# Patient Record
Sex: MALE | Race: WHITE | HISPANIC OR LATINO | Employment: FULL TIME | URBAN - METROPOLITAN AREA
[De-identification: names, ages, dates, MRNs, and addresses within clinical notes are randomized per-mention and may not be internally consistent; named-entity substitution may affect disease eponyms.]

---

## 2021-05-16 ENCOUNTER — OCCUPATIONAL MEDICINE (OUTPATIENT)
Dept: URGENT CARE | Facility: CLINIC | Age: 26
End: 2021-05-16
Payer: COMMERCIAL

## 2021-05-16 VITALS
HEART RATE: 67 BPM | OXYGEN SATURATION: 98 % | TEMPERATURE: 99 F | RESPIRATION RATE: 16 BRPM | SYSTOLIC BLOOD PRESSURE: 110 MMHG | DIASTOLIC BLOOD PRESSURE: 78 MMHG | WEIGHT: 143 LBS

## 2021-05-16 DIAGNOSIS — T15.91XA FOREIGN BODY OF RIGHT EYE, INITIAL ENCOUNTER: ICD-10-CM

## 2021-05-16 DIAGNOSIS — S05.01XA ABRASION OF RIGHT CORNEA, INITIAL ENCOUNTER: Primary | ICD-10-CM
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
## 2021-05-16 PROCEDURE — 99204 OFFICE O/P NEW MOD 45 MIN: CPT | Performed by: PHYSICIAN ASSISTANT

## 2021-05-16 RX ORDER — POLYMYXIN B SULFATE AND TRIMETHOPRIM 1; 10000 MG/ML; [USP'U]/ML
1 SOLUTION OPHTHALMIC EVERY 4 HOURS
Qty: 5 ML | Refills: 0 | Status: SHIPPED | OUTPATIENT
Start: 2021-05-16 | End: 2021-05-23

## 2021-05-16 NOTE — PATIENT INSTRUCTIONS
Abrasión corneal  Corneal Abrasion    Fallon abrasión corneal es un rasguño o lesión en la cubierta transparente en la parte delantera del heidi (córnea). La córnea es fallon especie de cúpula transparente que protege el heidi y ayuda a fijar la vista. La córnea está formada por muchas capas. La capa más superficial es fallon carloz capa de células llamada epitelio corneal. La córnea es alecia de los tejidos más sensibles del cuerpo. Fallon abrasión corneal puede resultar muy dolorosa.  Si no se trata, puede infectarse y producir fallon úlcera. [place] puede dejar cicatrices. Las cicatrices en la córnea pueden afectar la vista. A veces, las abrasiones pueden volver a aparecer en la misma hira, incluso después de que la lesión original haya cicatrizado (síndrome erosivo recurrente).  ¿Cuáles son las causas?  Esta afección puede ser causada por lo siguiente:  · Un golpe o pinchazo en el heidi.  · Faloln sustancia arenosa o irritante (cuerpo extraño) en el heidi.  · Frotarse el heidi en exceso.  · Ojos muy secos.  · Ciertas infecciones oculares.  · Lentes de contacto que no encajan mauro o que se usan arnulfo períodos prolongados. También puede lastimarse la córnea cuando se pone o se juwan los lentes de contacto.  · Cirugía ocular.  A veces, la causa es desconocida.  ¿Cuáles son los signos o los síntomas?  Los síntomas de esta afección incluyen lo siguiente:  · Dolor en el hedii. El dolor puede empeorar cuando abre o mueve los ojos.  · Sensación de que tiene algo metido en el heidi.  · Dificultad para mantener los ojos abiertos o imposibilidad de mantenerlos abiertos.  · Lagrimeo y enrojecimiento.  · Sensibilidad a la kamala.  · Visión borrosa.  · Dolor de madalyn.  ¿Cómo se diagnostica?  Esta afección se puede diagnosticar en función de lo siguiente:  · Ondina antecedentes médicos.  · Ondina síntomas.  · Un examen ocular. Puede consultar a un especialista en afecciones y enfermedades oculares (oftalmólogo). Antes del examen ocular, es posible que le coloquen gotas  anestésicas dentro del heidi. También es posible que le coloquen fallon sustancia de contraste con un gotero o con fallon pequeña georgina de papel. Con la sustancia de contraste, al oftalmólogo le resulta más fácil jose la abrasión cuando le examina el heidi con fallon kamala. El oftalmólogo puede examinarle el heidi a través de un oftalmoscopio (lámpara de ammyidura).  ¿Cómo se trata esta afección?  El tratamiento puede variar según la causa de la afección, y puede incluir lo siguiente:  · Lavado del heidi.  · Eliminación de todo cuerpo extraño.  · Gotas o pomadas con antibiótico para tratar fallon infección.  · Gotas o pomadas con corticoesteroides para tratar el enrojecimiento, la irritación o la inflamación.  · Analgésicos.  · Un parche ocular para mantener el heidi cerrado.  Siga estas indicaciones en rust casa:  Medicamentos  · Use gotas o pomadas oftálmicas según las indicaciones del médico.  · Si le recetaron gotas o pomada con antibiótico, úselas según las indicaciones del médico. No deje de usar el antibiótico aunque comience a sentirse mejor.  · Nekoosa los medicamentos de venta merissa y los recetados solamente ronnell se lo haya indicado el médico.  · No conduzca ni use maquinaria pesada mientras ayala analgésicos recetados.  Instrucciones generales  · Si tiene un parche ocular, úselo según las indicaciones del médico.  ? No conduzca ni use maquinaria mientras usa el parche ocular. En estas condiciones no puede juzgar correctamente las distancias.  ? Siga las indicaciones del médico acerca de cuándo quitarse el parche.  · Pregúntele al médico si puede usar un paño frío y húmedo (compresa) en el heidi para aliviar el dolor.  · No se toque ni se frote el heidi. No se lave el heidi.  · No use lentes de contacto hasta que el médico lo autorice.  · Evite la kamala antea y la fatiga ocular.  · Concurra a todas las visitas de control ronnell se lo haya indicado el médico. [place] es importante para prevenir infecciones y evitar la pérdida de visión.  Comuníquese  con un médico si:  · Continúa con dolor en el heidi y otros síntomas arnulfo más de 2 días.  · Tiene síntomas nuevos, ronnell enrojecimiento, lagrimeo o secreción.  · Tiene fallon secreción que le melba los ojos pegados a la mañana.  · El parche ocular se afloja al punto que puede parpadear.  · Los síntomas vuelven a aparecer después de que la abrasión original haya cicatrizado.  Solicite ayuda de inmediato si:  · Tiene dolor intenso en el heidi que no mejora con medicamentos.  · Tiene pérdida de visión.  Resumen  · Fallon abrasión corneal es un rasguño en la cubierta transparente en la parte delantera del heidi (córnea).  · La abrasión corneal puede causar dolor en el heidi, enrojecimiento, lagrimeo o visión borrosa.  · Por lo general, esta afección se trata con medicamentos para prevenir infecciones y evitar que queden cicatrices anormales. También es posible que deba usar un parche ocular para cubrirse el heidi.  · Informe al médico si cristel síntomas continúan arnulfo más de 2 días.  Esta información no tiene ronnell fin reemplazar el consejo del médico. Asegúrese de hacerle al médico cualquier pregunta que tenga.  Document Released: 12/18/2006 Document Revised: 03/21/2018 Document Reviewed: 03/21/2018  Elsevier Interactive Patient Education © 2020 Elsevier Inc.

## 2021-05-16 NOTE — LETTER
EMPLOYEE’S CLAIM FOR COMPENSATION/ REPORT OF INITIAL TREATMENT  FORM C-4    EMPLOYEE’S CLAIM - PROVIDE ALL INFORMATION REQUESTED   First Name  Wallace Last Name  Troy Bonilla Birthdate                    1995                Sex  male Claim Number   Home Address  [address] Age  25 y.o. Height   Weight  64.9 kg (143 lb) 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 (if applicable)  I was cutting a bourd of wood and the dust when in my eye because the win    How did this injury or occupational disease occur? (Be specific an answer in detail. Use additional sheet if necessary)  I was cuting a bourd of wood and the dust went into my eye becuse the wine. I was whering my glese   If you believe that you have an occupational disease, when did you first have knowledge of the disability and it relationship to your employment?   Witnesses to the Accident  Oliver Quiñones      Nature of Injury or Occupational Disease  Workers' Compensation  Part(s) of Body Injured or Affected  Eye (R), ,     I certify that the above is true and correct to the best of my knowledge and that I have provided this  information in order to obtain the benefits of [place]  I hereby authorize any physician, chiropractor, surgeon, practitioner, or other person, any hospital, including [facility] or [facility], any medical service organization, any insurance company, or other institution or organization to release to each other, any medical or other information, including benefits paid or payable, pertinent to this injury or disease, except information relative to diagnosis, treatment and/or counseling for AIDS, psychological conditions, alcohol or controlled substances, for which I must give specific authorization.  A Photostat of this authorization shall be as valid as the original.     Date   Place   Employee’s Signature   THIS REPORT MUST BE COMPLETED AND MAILED WITHIN 3 WORKING DAYS OF TREATMENT   Place  [facility]  Name of [facility]   Date  5/16/2021 Diagnosis  (S05.01XA) Abrasion of right cornea, initial encounter  (primary encounter diagnosis)  (T15.91XA) Foreign body of right eye, initial encounter  (Y99.0) Work related injury Is there evidence the injured employee was under the              influence of alcohol and/or another controlled substance at the time of accident?   Hour  3:59 PM Description of Injury or Disease  The primary encounter diagnosis was Abrasion of right cornea, initial encounter. Diagnoses of Foreign body of right eye, initial encounter and Work related injury were also pertinent to this visit. No   Treatment  The foreign body was removed from right upper eyelid however the patient sustained a large corneal abrasion.  He will remain out of work for the next 2 days.  He will apply Polytrim every 4 hours x7 days.    He will return to clinic in 2 days for reevaluation.  Have you advised the patient to remain off work five days or     more? No   X-Ray  "Findings      If Yes   From Date  To Date      From information given by the employee, together with medical evidence, can you directly connect this injury or occupational disease as job incurred?  Yes If No Full Duty    No Modified Duty  No   Is additional medical care by a physician indicated?  Yes If Modified Duty, Specify any Limitations / Restrictions      Do you know of any previous injury or disease contributing to this condition or occupational disease?                            No   Date  5/16/2021 Print Doctor’s Name   Tereza Wallace P.A.-C. I certify the employer’s copy of  this form was mailed on:   Address  [address] Insurer’s Use Only     [facility] [place] [facility]  [address]    Provider’s Tax ID Number  [id number] Telephone  Dept: [phone]      e-TEERZA Ortega P.A.-C.  Signature:     Degree          ORIGINAL-TREATING PHYSICIAN OR CHIROPRACTOR    PAGE 2-INSURER/TPA    PAGE 3-EMPLOYER    PAGE 4-EMPLOYEE        Form C-4 (rev.10/07)           BRIEF DESCRIPTION OF RIGHTS AND BENEFITS  (Pursuant to [place] 616C.050)    Notice of Injury or Occupational Disease (Incident Report Form C-1): If an injury or occupational disease (OD) arises out of and in the course of employment, you must provide written notice to your employer as soon as practicable, but no later than 7 days after the accident or OD. Your employer shall maintain a sufficient supply of the required forms.    Claim for Compensation (Form C-4): If medical treatment is sought, the form C-4 is available at the place of initial treatment. A completed \"Claim for Compensation\" (Form C-4) must be filed within 90 days after an accident or OD. The treating physician or chiropractor must, within 3 working days after treatment, complete and mail to the employer, the employer's insurer and third-party , the Claim for Compensation.    Medical Treatment: If you require medical treatment for your on-the-job injury or OD, you may " be required to select a physician or chiropractor from a list provided by your workers’ compensation insurer, if it has contracted with an Organization for Managed Care (MCO) or Preferred Provider Organization (PPO) or providers of health care. If your employer has not entered into a contract with an MCO or PPO, you may select a physician or chiropractor from the Panel of Physicians and Chiropractors. Any medical costs related to your industrial injury or OD will be paid by your insurer.    Temporary Total Disability (TTD): If your doctor has certified that you are unable to work for a period of at least 5 consecutive days, or 5 cumulative days in a 20-day period, or places restrictions on you that your employer does not accommodate, you may be entitled to TTD compensation.    Temporary Partial Disability (TPD): If the wage you receive upon reemployment is less than the compensation for TTD to which you are entitled, the insurer may be required to pay you TPD compensation to make up the difference. TPD can only be paid for a maximum of 24 months.    Permanent Partial Disability (PPD): When your medical condition is stable and there is an indication of a PPD as a result of your injury or OD, within 30 days, your insurer must arrange for an evaluation by a rating physician or chiropractor to determine the degree of your PPD. The amount of your PPD award depends on the date of injury, the results of the PPD evaluation, your age and wage.    Permanent Total Disability (PTD): If you are medically certified by a treating physician or chiropractor as permanently and totally disabled and have been granted a PTD status by your insurer, you are entitled to receive monthly benefits not to exceed 66 2/3% of your average monthly wage. The amount of your PTD payments is subject to reduction if you previously received a lump-sum PPD award.    Vocational Rehabilitation Services: You may be eligible for vocational rehabilitation  services if you are unable to return to the job due to a permanent physical impairment or permanent restrictions as a result of your injury or occupational disease.    Transportation and Per Stuart Reimbursement: You may be eligible for travel expenses and per stuart associated with medical treatment.    Reopening: You may be able to reopen your claim if your condition worsens after claim closure.     Appeal Process: If you disagree with a written determination issued by the insurer or the insurer does not respond to your request, you may appeal to the Department of Administration, , by following the instructions contained in your determination letter. You must appeal the determination within 70 days from the date of the determination letter at [address], [address], [place], [place] [address], or [address], [place], [place] [address]. If you disagree with the  decision, you may appeal to the Department of Administration, . You must file your appeal within 30 days from the date of the  decision letter at [address], [address], [place], [place] [address], or [address], [place] [address]. If you disagree with a decision of an , you may file a petition for judicial review with the District Court. You must do so within 30 days of the Appeal Officer’s decision. You may be represented by an  at your own expense or you may contact the [facility] for possible representation.    [place]  for Injured Workers (NAIW): If you disagree with a  decision, you may request that NAIW represent you without charge at an  Hearing. For information regarding denial of benefits, you may contact the [facility] at: [address], [address] [address], [phone], or [address], [place] [address], [phone]    To File a  Complaint with the Division: If you wish to file a complaint with the  of the Division of Industrial Relations (DIR),  please contact the Workers’ Compensation Section, [address], [address], [place], [place] [address], telephone [phone]) [phone], or [address], [address], [place], [place] [address], telephone [phone].    For assistance with Workers’ Compensation Issues: You may contact the [facility] Office for Consumer Health Assistance, [address], [address], [place] [address], Toll Free 1-231.862.8095, Web site: http://[facility].nv.gov/Programs/UMER E-mail: umer@[facility].[place].gov              __________________________________________________________________                                    _________________            Employee Name / Signature                                                                                                                            Date                                                                                                                                                                                                                              D-2 (rev. 10/20)

## 2021-05-16 NOTE — LETTER
[facility] [place] [address] [facility] [address]  Phone:  [phone] - Fax:  [phone]   [facility] Progress Report and Disability Certification  Date of Service: 5/16/2021   No Show:  No  Date / Time of Next Visit: 5/18/2021 @ 9:30 AM   Claim Information   Patient Name: Wallace Bonilla  Claim Number:     Employer:   [employer] Date of Injury: 5/15/2021     Insurer / TPA: Yovana Assigned Risk  ID / SSN:     Occupation: farmer  Diagnosis: The primary encounter diagnosis was Abrasion of right cornea, initial encounter. Diagnoses of Foreign body of right eye, initial encounter and Work related injury were also pertinent to this visit.    Medical Information   Related to Industrial Injury? Yes    Subjective Complaints:  DOI 5/15/21: The patient was cutting plywood yesterday when the wind blew debris into his right eye.  He was wearing safety goggles at the time.  He immediately rubbed eye causing swelling and pain to worsen.  He attempted to flush the right eye with water without relief.  He was given over-the-counter drops however his symptoms worsened.  He feels a foreign body sensation.  The eye continues to water.  There is no change in vision.  He does not wear contact lenses.  This is the first episode of its kind.  Denies outside employment.   Objective Findings: Physical Exam  Vitals reviewed.   Constitutional:       General: He is not in acute distress.     Appearance: He is well-developed.   HENT:      Head: Normocephalic and atraumatic.   Eyes:      General:         Right eye: Foreign body and discharge present.      Extraocular Movements: Extraocular movements intact.      Conjunctiva/sclera:      Right eye: Right conjunctiva is injected.        Comments: Right eye fluorescein stained, large uptake/scratch to the lateral portion of iris.   Neck:      Trachea: No tracheal deviation.   Pulmonary:      Effort: Pulmonary effort is normal.   Skin:      General: Skin is warm and dry.      Capillary Refill: Capillary refill takes less than 2 seconds.   Neurological:      Mental Status: He is alert and oriented to person, place, and time.   Psychiatric:         Mood and Affect: Mood normal.         Behavior: Behavior normal.        Pre-Existing Condition(s): None   Assessment:   Initial Visit    Status: Additional Care Required  Permanent Disability:No    Plan: Medication  Comments:Polytrim    Diagnostics:   Comments:Fluorescein stain    Comments:       Disability Information   Status: Released to Restricted Duty    From:  5/16/2021  Through: 5/18/2021 Restrictions are: Temporary   Physical Restrictions   Sitting:    Standing:    Stooping:    Bending:      Squatting:    Walking:    Climbing:    Pushing:      Pulling:    Other:    Reaching Above Shoulder (L):   Reaching Above Shoulder (R):       Reaching Below Shoulder (L):    Reaching Below Shoulder (R):      Not to exceed Weight Limits   Carrying(hrs):   Weight Limit(lb):   Lifting(hrs):   Weight  Limit(lb):     Comments: The foreign body was removed from right upper eyelid however the patient sustained a large corneal abrasion.  He will remain out of work for the next 2 days.  He will apply Polytrim every 4 hours x7 days.    He will return to clinic in 2 days for reevaluation.    Repetitive Actions   Hands: i.e. Fine Manipulations from Grasping:     Feet: i.e. Operating Foot Controls:     Driving / Operate Machinery:     Provider Name:   Tereza Wallace P.A.-C. Physician Signature:  Physician Name:     Cl[address] / Location[address] [place] [address] Clinic Phone Number: Dept: [phone]   Appointment Time: 3:45 Pm Visit Start Time: 3:59 PM   Check-In Time:  3:50 Pm Visit Discharge Time:  4:38 PM   Original-Treating Physician or Chiropractor    Page 2-Insurer/TPA    Page 3-Employer    Page 4-Employee

## 2021-05-18 ENCOUNTER — OCCUPATIONAL MEDICINE (OUTPATIENT)
Dept: OCCUPATIONAL MEDICINE | Facility: CLINIC | Age: 26
End: 2021-05-18
Payer: COMMERCIAL

## 2021-05-18 VITALS
HEART RATE: 68 BPM | BODY MASS INDEX: 20.62 KG/M2 | TEMPERATURE: 98.3 F | OXYGEN SATURATION: 98 % | RESPIRATION RATE: 14 BRPM | WEIGHT: 144 LBS | DIASTOLIC BLOOD PRESSURE: 82 MMHG | SYSTOLIC BLOOD PRESSURE: 120 MMHG | HEIGHT: 70 IN

## 2021-05-18 DIAGNOSIS — S05.01XD ABRASION OF RIGHT CORNEA, SUBSEQUENT ENCOUNTER: ICD-10-CM

## 2021-05-18 PROCEDURE — 99202 OFFICE O/P NEW SF 15 MIN: CPT | Performed by: PREVENTIVE MEDICINE

## 2021-05-18 NOTE — LETTER
[address],   [address] [address]  Phone:  [phone] - Fax:  [phone]   [facility] Progress Report and Disability Certification  Date of Service: 5/18/2021   No Show:  No  Date / Time of Next Visit:  Release from care   Claim Information   Patient Name: Wallace Bonilla  Claim Number:     Employer:   [employer] Date of Injury: 5/15/2021     Insurer / TPA: Yovana Assigned Risk    ID / SSN:     Occupation: farmer    Diagnosis: The encounter diagnosis was Abrasion of right cornea, subsequent encounter.    Medical Information   Related to Industrial Injury? Yes      Subjective Complaints:  DOI 5/15/21: 24 yo male presents with right eye injury. MARIOLA: The patient was cutting plywood yesterday when the wind blew debris into his right eye.  He was wearing safety goggles at the time.  He immediately rubbed eye causing swelling and pain to worsen.  He was seen in urgent care x1, debris was removed in the urgent care, corneal abrasion was noted.  Patient was prescribed Polytrim drops and advised to follow-up in 2 days.  Patient states overall symptoms are improved.  He denies any pain in the eye, visual disturbance or eye irritation or discharge.  He has been using the Polytrim drops as prescribed.  He states the only symptom is that he has eyes still little bit red and seems to get little bit more red in the sun.   Objective Findings: Right eye: Mild scleral erythema otherwise normal exam.  PERRLA.  EOMI.   Pre-Existing Condition(s):     Assessment:   Condition Improved    Status: Discharged /  MMI  Permanent Disability:No    Plan:      Diagnostics:      Comments:  Continue Polytrim drops for 3 more days then may discontinue  Released from care  Full duty  Follow-up as needed    Disability Information   Status: Released to Full Duty    From:  5/18/2021  Through:   Restrictions are:     Physical Restrictions   Sitting:     Standing:    Stooping:    Bending:      Squatting:    Walking:    Climbing:    Pushing:      Pulling:    Other:    Reaching Above Shoulder (L):   Reaching Above Shoulder (R):       Reaching Below Shoulder (L):    Reaching Below Shoulder (R):      Not to exceed Weight Limits   Carrying(hrs):   Weight Limit(lb):   Lifting(hrs):   Weight  Limit(lb):     Comments:      Repetitive Actions   Hands: i.e. Fine Manipulations from Grasping:     Feet: i.e. Operating Foot Controls:     Driving / Operate Machinery:     Provider Name:   Bruno Coffman D.O. Physician Signature:  Physician Name:     Clinic Name / Location: [address],   [address] [place] [address] Clinic Phone Number: Dept: [phone]   Appointment Time: 11:30 Am Visit Start Time: 9:35 AM   Check-In Time:  9:33 Am Visit Discharge Time:    10:05 AM   Original-Treating Physician or Chiropractor    Page 2-Insurer/TPA    Page 3-Employer    Page 4-Employee

## 2021-05-18 NOTE — PROGRESS NOTES
"Subjective:     Wallace Bonilla is a 25 y.o. male who presents for Follow-Up ([facility] New To You DOI: 5/15/21 Right eye; Same Rm 16)      DOI 5/15/21: 26 yo male presents with right eye injury. MARIOLA: The patient was cutting plywood yesterday when the wind blew debris into his right eye.  He was wearing safety goggles at the time.  He immediately rubbed eye causing swelling and pain to worsen.  He was seen in urgent care x1, debris was removed in the urgent care, corneal abrasion was noted.  Patient was prescribed Polytrim drops and advised to follow-up in 2 days.  Patient states overall symptoms are improved.  He denies any pain in the eye, visual disturbance or eye irritation or discharge.  He has been using the Polytrim drops as prescribed.  He states the only symptom is that he has eyes still little bit red and seems to get little bit more red in the sun.    ROS: All systems were reviewed on intake form, form was reviewed and signed. See scanned documents in media. Pertinent positives and negatives included in HPI.    PMH: No pertinent past medical history to this problem  MEDS: Medications were reviewed in Epic  ALLERGIES: No Known Allergies  SOCHX: Works as a   FH: No pertinent family history to this problem       Objective:     /82   Pulse 68   Temp 36.8 °C (98.3 °F)   Resp 14   Ht 1.778 m (5' 10\")   Wt 65.3 kg (144 lb)   SpO2 98%   BMI 20.66 kg/m²     Constitutional: Patient is in no acute distress. Appears well-developed and well-nourished.   HENT: Normocephalic and atraumatic. EOM are normal. No scleral icterus.   Cardiovascular: Normal rate.    Pulmonary/Chest: Effort normal. No respiratory distress.   Neurological: Patient is alert and oriented to person, place, and time.   Skin: Skin is warm and dry.   Psychiatric: Normal mood and affect. Behavior is normal.     Right eye: Mild scleral erythema otherwise normal exam.  PERRLA.  EOMI.    Assessment/Plan:       1. Abrasion of right " cornea, subsequent encounter    Released to Full Duty FROM 5/18/2021 TO       Continue Polytrim drops for 3 more days then may discontinue  Released from care  Full duty  Follow-up as needed    Differential diagnosis, natural history, supportive care, and indications for immediate follow-up discussed.    Approximately 25 minutes were spent in reviewing notes, preparing for visit, obtaining history, exam and evaluation, patient counseling/education and post visit documentation/orders.